# Patient Record
(demographics unavailable — no encounter records)

---

## 2024-10-30 NOTE — PHYSICAL EXAM
[Appropriately responsive] : appropriately responsive [Alert] : alert [Oriented x3] : oriented x3 [FreeTextEntry2] : distressed, frustrated

## 2024-10-30 NOTE — HISTORY OF PRESENT ILLNESS
[FreeTextEntry1] : 45 yo P9, h/o BTL, presents for fertility consultation. Pt now with a new partner, interested in having another child. Is seeking out provider who can perform a "tubal reversal." Discussed that this surgery is no longer performed, associated with increased risks of pregnancy complications including ectopic pregnancy, low success rate, and current recommendation would be to perform IVF to achieve pregnancy in the case of tubal factor infertility. She reports she is otherwise healthy with regular menses.   On review of [facility], operative report reviewed. Pt had modified Edel BTL with portion of each tube removed. [No] : Patient does not have concerns regarding sex

## 2024-10-30 NOTE — DISCUSSION/SUMMARY
[FreeTextEntry1] : 45 yo P9, h/o BTL desiring fertility  -Counseled that tubal reversal surgery not recommended -Recommended IVF -Referral to [facility] given